# Patient Record
Sex: FEMALE | Race: WHITE | HISPANIC OR LATINO | Employment: UNEMPLOYED | URBAN - METROPOLITAN AREA
[De-identification: names, ages, dates, MRNs, and addresses within clinical notes are randomized per-mention and may not be internally consistent; named-entity substitution may affect disease eponyms.]

---

## 2019-07-31 ENCOUNTER — HOSPITAL ENCOUNTER (OUTPATIENT)
Dept: LAB | Facility: MEDICAL CENTER | Age: 34
End: 2019-07-31
Payer: COMMERCIAL

## 2019-07-31 LAB
BP DIAS: 77 MMHG
BP SYS: 113 MMHG
CHOLEST SERPL-MCNC: 140 MG/DL (ref 100–199)
DIABETES HTDIA: NO
EVENT NAME HTEVT: NORMAL
GLUCOSE SERPL-MCNC: 96 MG/DL (ref 65–99)
HDLC SERPL-MCNC: 37 MG/DL
HYPERTENSION HTHYP: NO
LDLC SERPL CALC-MCNC: 70 MG/DL
SCREENING LOC CITY HTCIT: NORMAL
SCREENING LOC STATE HTSTA: NORMAL
SCREENING LOCATION HTLOC: NORMAL
SMOKING HTSMO: NO
SUBSCRIBER ID HTSID: NORMAL
TRIGL SERPL-MCNC: 166 MG/DL (ref 0–149)

## 2019-08-01 LAB — FASTING STATUS PATIENT QL REPORTED: NORMAL

## 2020-01-09 ENCOUNTER — APPOINTMENT (RX ONLY)
Dept: URBAN - METROPOLITAN AREA CLINIC 31 | Facility: CLINIC | Age: 35
Setting detail: DERMATOLOGY
End: 2020-01-09

## 2020-01-09 DIAGNOSIS — L40.0 PSORIASIS VULGARIS: ICD-10-CM

## 2020-01-09 PROCEDURE — ? PRESCRIPTION

## 2020-01-09 PROCEDURE — ? COUNSELING

## 2020-01-09 PROCEDURE — 99202 OFFICE O/P NEW SF 15 MIN: CPT

## 2020-01-09 RX ORDER — CLOBETASOL PROPIONATE 0.5 MG/ML
SOLUTION TOPICAL QHS
Qty: 1 | Refills: 2 | COMMUNITY
Start: 2020-01-09

## 2020-01-09 RX ADMIN — CLOBETASOL PROPIONATE: 0.5 SOLUTION TOPICAL at 00:00

## 2020-01-09 ASSESSMENT — LOCATION SIMPLE DESCRIPTION DERM
LOCATION SIMPLE: SCALP
LOCATION SIMPLE: LEFT SCALP

## 2020-01-09 ASSESSMENT — LOCATION DETAILED DESCRIPTION DERM
LOCATION DETAILED: LEFT CENTRAL PARIETAL SCALP
LOCATION DETAILED: LEFT MEDIAL FRONTAL SCALP
LOCATION DETAILED: RIGHT CENTRAL PARIETAL SCALP

## 2020-01-09 ASSESSMENT — LOCATION ZONE DERM: LOCATION ZONE: SCALP

## 2020-01-09 NOTE — PROCEDURE: COUNSELING
Patient Specific Counseling (Will Not Stick From Patient To Patient): Recommended use of clobetasol nightly for 6 weeks, then RTC and reassess.
Detail Level: Simple

## 2020-02-27 ENCOUNTER — APPOINTMENT (RX ONLY)
Dept: URBAN - METROPOLITAN AREA CLINIC 31 | Facility: CLINIC | Age: 35
Setting detail: DERMATOLOGY
End: 2020-02-27

## 2020-02-27 DIAGNOSIS — L40.0 PSORIASIS VULGARIS: ICD-10-CM | Status: IMPROVED

## 2020-02-27 PROCEDURE — ? COUNSELING

## 2020-02-27 PROCEDURE — 99213 OFFICE O/P EST LOW 20 MIN: CPT

## 2020-02-27 ASSESSMENT — LOCATION SIMPLE DESCRIPTION DERM
LOCATION SIMPLE: LEFT SCALP
LOCATION SIMPLE: SCALP

## 2020-02-27 ASSESSMENT — LOCATION DETAILED DESCRIPTION DERM
LOCATION DETAILED: RIGHT CENTRAL PARIETAL SCALP
LOCATION DETAILED: LEFT CENTRAL PARIETAL SCALP
LOCATION DETAILED: LEFT MEDIAL FRONTAL SCALP

## 2020-02-27 ASSESSMENT — LOCATION ZONE DERM: LOCATION ZONE: SCALP

## 2020-02-27 NOTE — PROCEDURE: COUNSELING
Patient Specific Counseling (Will Not Stick From Patient To Patient): Continue use of clobetasol solution as needed on scalp
Detail Level: Zone

## 2021-04-02 RX ORDER — CLOBETASOL PROPIONATE 0.5 MG/ML
SOLUTION TOPICAL QHS
Qty: 1 | Refills: 2 | Status: ERX

## 2021-09-24 ENCOUNTER — HOSPITAL ENCOUNTER (EMERGENCY)
Dept: HOSPITAL 8 - ED | Age: 36
Discharge: HOME | End: 2021-09-24
Payer: COMMERCIAL

## 2021-09-24 VITALS — DIASTOLIC BLOOD PRESSURE: 87 MMHG

## 2021-09-24 VITALS — WEIGHT: 184.31 LBS | BODY MASS INDEX: 36.18 KG/M2 | HEIGHT: 60 IN

## 2021-09-24 VITALS — SYSTOLIC BLOOD PRESSURE: 128 MMHG

## 2021-09-24 DIAGNOSIS — Z20.822: ICD-10-CM

## 2021-09-24 DIAGNOSIS — R51.9: Primary | ICD-10-CM

## 2021-09-24 DIAGNOSIS — R11.0: ICD-10-CM

## 2021-09-24 PROCEDURE — 96372 THER/PROPH/DIAG INJ SC/IM: CPT

## 2021-09-24 PROCEDURE — U0003 INFECTIOUS AGENT DETECTION BY NUCLEIC ACID (DNA OR RNA); SEVERE ACUTE RESPIRATORY SYNDROME CORONAVIRUS 2 (SARS-COV-2) (CORONAVIRUS DISEASE [COVID-19]), AMPLIFIED PROBE TECHNIQUE, MAKING USE OF HIGH THROUGHPUT TECHNOLOGIES AS DESCRIBED BY CMS-2020-01-R: HCPCS

## 2021-09-24 PROCEDURE — 99283 EMERGENCY DEPT VISIT LOW MDM: CPT

## 2022-05-18 ENCOUNTER — TELEPHONE (OUTPATIENT)
Dept: SCHEDULING | Facility: IMAGING CENTER | Age: 37
End: 2022-05-18

## 2022-06-08 ENCOUNTER — RX ONLY (OUTPATIENT)
Age: 37
Setting detail: RX ONLY
End: 2022-06-08

## 2022-06-08 RX ORDER — CLOBETASOL PROPIONATE 0.5 MG/ML
SOLUTION TOPICAL
Qty: 50 | Refills: 0 | Status: ERX | COMMUNITY
Start: 2022-06-08

## 2022-06-21 ENCOUNTER — APPOINTMENT (OUTPATIENT)
Dept: MEDICAL GROUP | Facility: MEDICAL CENTER | Age: 37
End: 2022-06-21

## 2022-07-07 ENCOUNTER — TELEPHONE (OUTPATIENT)
Dept: MEDICAL GROUP | Facility: MEDICAL CENTER | Age: 37
End: 2022-07-07

## 2022-07-07 ENCOUNTER — OFFICE VISIT (OUTPATIENT)
Dept: MEDICAL GROUP | Facility: MEDICAL CENTER | Age: 37
End: 2022-07-07
Payer: COMMERCIAL

## 2022-07-07 VITALS
OXYGEN SATURATION: 96 % | TEMPERATURE: 97.9 F | BODY MASS INDEX: 26.64 KG/M2 | DIASTOLIC BLOOD PRESSURE: 82 MMHG | HEIGHT: 61 IN | HEART RATE: 88 BPM | WEIGHT: 141.09 LBS | SYSTOLIC BLOOD PRESSURE: 128 MMHG

## 2022-07-07 DIAGNOSIS — K76.0 FATTY LIVER: ICD-10-CM

## 2022-07-07 DIAGNOSIS — Z00.00 PREVENTATIVE HEALTH CARE: ICD-10-CM

## 2022-07-07 DIAGNOSIS — Z13.29 THYROID DISORDER SCREEN: ICD-10-CM

## 2022-07-07 DIAGNOSIS — Z13.220 LIPID SCREENING: ICD-10-CM

## 2022-07-07 DIAGNOSIS — R55 SYNCOPE, UNSPECIFIED SYNCOPE TYPE: ICD-10-CM

## 2022-07-07 DIAGNOSIS — L40.9 PSORIASIS: ICD-10-CM

## 2022-07-07 DIAGNOSIS — R73.01 IMPAIRED FASTING BLOOD SUGAR: ICD-10-CM

## 2022-07-07 DIAGNOSIS — Z12.4 CERVICAL CANCER SCREENING: ICD-10-CM

## 2022-07-07 DIAGNOSIS — G43.709 CHRONIC MIGRAINE WITHOUT AURA WITHOUT STATUS MIGRAINOSUS, NOT INTRACTABLE: ICD-10-CM

## 2022-07-07 PROCEDURE — 99204 OFFICE O/P NEW MOD 45 MIN: CPT | Performed by: INTERNAL MEDICINE

## 2022-07-07 ASSESSMENT — ENCOUNTER SYMPTOMS
LOSS OF CONSCIOUSNESS: 1
FEVER: 0
DIZZINESS: 1
SENSORY CHANGE: 0
TREMORS: 0
PALPITATIONS: 0
CHILLS: 0
NAUSEA: 0
WEAKNESS: 0
COUGH: 0
WEIGHT LOSS: 0
SPEECH CHANGE: 0
FOCAL WEAKNESS: 0
HEADACHES: 1
DEPRESSION: 0
VOMITING: 0
TINGLING: 0
SORE THROAT: 0

## 2022-07-07 ASSESSMENT — PATIENT HEALTH QUESTIONNAIRE - PHQ9: CLINICAL INTERPRETATION OF PHQ2 SCORE: 0

## 2022-07-07 NOTE — LETTER
[facility]  Yesica Fulton M.D.  [address]  [place] [address]  Fax: [phone]   Authorization for Release/Disclosure of   Protected Health Information   Name: NIKO DELGADO : 1985 SSN: [id number]   Address: [address] [place] [address] [place] [address] Phone:    [phone] (home)    I authorize the entity listed below to release/disclose the PHI below to:   [facility]/Yesica Fulton M.D. and Yesica Fulton M.D.   Provider or Entity Name:  [facility]    Address   [facility], Zip  [address] [address]  Phone:      Fax:     Reason for request: continuity of care   Information to be released:    [  ] LAST COLONOSCOPY,  including any PATH REPORT and follow-up  [  ] LAST FIT/COLOGUARD RESULT [  ] LAST DEXA  [  ] LAST MAMMOGRAM  [  ] LAST PAP  [  x] LAST LABS [  ] RETINA EXAM REPORT  [  ] IMMUNIZATION RECORDS  [x  ] Release all info      [  ] Check here and initial the line next to each item to release ALL health information INCLUDING  _____ Care and treatment for drug and / or alcohol abuse  _____ HIV testing, infection status, or AIDS  _____ Genetic Testing    DATES OF SERVICE OR TIME PERIOD TO BE DISCLOSED: _____________  I understand and acknowledge that:  * This Authorization may be revoked at any time by you in writing, except if your health information has already been used or disclosed.  * Your health information that will be used or disclosed as a result of you signing this authorization could be re-disclosed by the recipient. If this occurs, your re-disclosed health information may no longer be protected by State or Federal laws.  * You may refuse to sign this Authorization. Your refusal will not affect your ability to obtain treatment.  * This Authorization becomes effective upon signing and will  on (date) __________.      If no date is indicated, this Authorization will  one (1) year from the signature date.     Name: Dionne Mckinnon    Signature:   Date:     7/7/2022       PLEASE FAX REQUESTED RECORDS BACK TO: [phone]

## 2022-07-07 NOTE — LETTER
[facility]"[employer] [facility]  Yesica Fulton M.D.  [address]  [place] [address]  Fax: [phone]   Authorization for Release/Disclosure of   Protected Health Information   Name: DIONNE DELGADO : 1985 SSN: [id number]   Address: [address] [place] [address]   [place] [address] Phone:    [phone] (home)    I authorize the entity listed below to release/disclose the PHI below to:   [facility]/Yesica Fulton M.D. and Yesica Fulton M.D.   Provider or Entity Name:  [facility]   Address   City, State, [place], [place] Phone:      Fax:     Reason for request: continuity of care   Information to be released:    [  ] LAST COLONOSCOPY,  including any PATH REPORT and follow-up  [  ] LAST FIT/COLOGUARD RESULT [  ] LAST DEXA  [  ] LAST MAMMOGRAM  [  ] LAST PAP  [  ] LAST LABS [  ] RETINA EXAM REPORT  [  ] IMMUNIZATION RECORDS  [  ] Release all info      [  ] Check here and initial the line next to each item to release ALL health information INCLUDING  _____ Care and treatment for drug and / or alcohol abuse  _____ HIV testing, infection status, or AIDS  _____ Genetic Testing    DATES OF SERVICE OR TIME PERIOD TO BE DISCLOSED: _____________  I understand and acknowledge that:  * This Authorization may be revoked at any time by you in writing, except if your health information has already been used or disclosed.  * Your health information that will be used or disclosed as a result of you signing this authorization could be re-disclosed by the recipient. If this occurs, your re-disclosed health information may no longer be protected by State or Federal laws.  * You may refuse to sign this Authorization. Your refusal will not affect your ability to obtain treatment.  * This Authorization becomes effective upon signing and will  on (date) __________.      If no date is indicated, this Authorization will  one (1) year from the signature date.    Name: Dionne  Pratibha    Signature:   Date:     7/7/2022       PLEASE FAX REQUESTED RECORDS BACK TO: [phone]

## 2022-07-07 NOTE — ASSESSMENT & PLAN NOTE
Chronic, currently well controlled.  Her episodes became really rare in the last few weeks.  Offered as needed medication-patient declines at this time.  Will refer to neurology.

## 2022-07-07 NOTE — PROGRESS NOTES
Subjective:     Chief Complaint   Patient presents with   • Syncope     In November   • Headache      Diagnoses of Syncope, unspecified syncope type, Chronic migraine without aura without status migrainosus, not intractable, Fatty liver, Psoriasis, Cervical cancer screening, Preventative health care, Lipid screening, Impaired fasting blood sugar, and Thyroid disorder screen were pertinent to this visit.    HISTORY OF THE PRESENT ILLNESS: Patient is a 36 y.o. female. This pleasant patient is here today to establish care. Her prior PCP was [facility] and   Napoleon Morris MD.    Problem   Syncope    Around October 2021, patient was working on her computer and she had a syncopal episode.  She woke up approximately 15 minutes after the episode, her friend told her that she was shaking her extremities at this time.  No tongue biting, urinary or stool incontinence.  Lasted for approximately 15 minutes without postictal state.   Since then patient has been experiencing recurrent headaches, no recurrent syncopal episodes.  Patient has  family history of intracranial bleeding and seizure disorder.        Chronic Migraine Without Aura Without Status Migrainosus, Not Intractable    Is a chronic problem, for over 15 years.  Patient and generally would have her migraine attacks once in a while.  From November 2021 to May 2022 she had very frequently sometimes every day.  In the last 2 weeks, she had only rarely.  Offered sumatriptan-patient declined at this time.  She manages her headaches with Tylenol as needed.       Fatty Liver    This is a chronic problem, her liver function tests were monitored by her prior family physician.  No recent LFTs available.     Psoriasis    Chronic, patient is seen dermatology in [place].  Using topical treatment and over-the-counter shampoo from [employer].       Past Medical History:   Diagnosis Date   • Fatty liver    • Syncope      History reviewed. No pertinent surgical  "history.  Family History   Problem Relation Age of Onset   • Other Father         brain hematoma, s/p trauma   • Other Brother         brain hematoma, s/p trauma   • Seizures Brother    • Cancer Paternal Grandmother         brain tumor   • Seizures Paternal Grandmother      Social History     Tobacco Use   • Smoking status: Never Smoker   • Smokeless tobacco: Never Used   Substance Use Topics   • Alcohol use: Never   • Drug use: Never     No current [facility]-ordered outpatient medications on file.     No current [facility]-ordered facility-administered medications on file.     Health Maintenance: Requested from PCP.    Review of Systems   Constitutional: Negative for chills, fever and weight loss.   HENT: Negative for sore throat.    Respiratory: Negative for cough.    Cardiovascular: Negative for chest pain and palpitations.   Gastrointestinal: Negative for nausea and vomiting.   Neurological: Positive for dizziness (intermittent), loss of consciousness (October 2021) and headaches. Negative for tingling, tremors, sensory change, speech change, focal weakness and weakness.   Psychiatric/Behavioral: Negative for depression.     Objective:     Exam: /82 (BP Location: Right arm, Patient Position: Sitting, BP Cuff Size: Adult)   Pulse 88   Temp 36.6 °C (97.9 °F) (Temporal)   Ht 1.549 m (5' 1\")   Wt 64 kg (141 lb 1.5 oz)   SpO2 96%  Body mass index is 26.66 kg/m².    Physical Exam  Vitals reviewed: Overweight.   HENT:      Head: Normocephalic and atraumatic.      Nose: Nose normal. No congestion or rhinorrhea.      Mouth/Throat:      Mouth: Mucous membranes are moist.      Pharynx: Oropharynx is clear. No oropharyngeal exudate.   Eyes:      General: No scleral icterus.     Pupils: Pupils are equal, round, and reactive to light.   Cardiovascular:      Rate and Rhythm: Normal rate and regular rhythm.      Pulses: Normal pulses.      Heart sounds: Normal heart sounds. No murmur heard.    No gallop.   Pulmonary:      " Effort: Pulmonary effort is normal. No respiratory distress.      Breath sounds: Normal breath sounds. No wheezing or rales.   Skin:     General: Skin is warm and dry.   Neurological:      Mental Status: She is alert and oriented to person, place, and time.      Cranial Nerves: No cranial nerve deficit or facial asymmetry.      Sensory: Sensation is intact.      Motor: Motor function is intact. No weakness, tremor, atrophy, abnormal muscle tone, seizure activity or pronator drift.      Coordination: Coordination normal.      Gait: Gait normal.      Deep Tendon Reflexes:      Reflex Scores:       Bicep reflexes are 2+ on the right side and 2+ on the left side.       Patellar reflexes are 2+ on the right side and 2+ on the left side.      Labs: Reviewed lipid panel from 7/31/2019.    Assessment & Plan:   36 y.o. female with the following -    Problem List Items Addressed This Visit     Chronic migraine without aura without status migrainosus, not intractable (Chronic)     Chronic, currently well controlled.  Her episodes became really rare in the last few weeks.  Offered as needed medication-patient declines at this time.  Will refer to neurology.           Relevant Orders    Referral to Neurology    Fatty liver (Chronic)     Chronic, symptomatic, obtain LFTs.           Relevant Orders    Comp Metabolic Panel    Psoriasis (Chronic)     Chronic, topical treatment as needed.           Syncope     Single episode in October 2021, lasted around 15 minutes.  Possible generalized clonic seizure?  Positive family history of seizure disorder, traumatic intracranial bleeding.   Recurrent headaches, obtain routine blood work, prior records from previous physician.  We will discuss imaging at next appointment in 3 to 4 weeks after reviewing previous records.    Refer to neurology.             Relevant Orders    Referral to Neurology      Other Visit Diagnoses     Cervical cancer screening        Relevant Orders    Referral to  Gynecology    Preventative health care        Relevant Orders    CBC WITH DIFFERENTIAL    Comp Metabolic Panel    Lipid screening        Relevant Orders    Lipid Profile    Impaired fasting blood sugar        Relevant Orders    HEMOGLOBIN A1C    Thyroid disorder screen        Relevant Orders    TSH WITH REFLEX TO FT4          Return in about 4 weeks (around 8/4/2022), or if symptoms worsen or fail to improve.    Please note that this dictation was created using voice recognition software. I have made every reasonable attempt to correct obvious errors, but I expect that there are errors of grammar and possibly content that I did not discover before finalizing the note.

## 2022-07-07 NOTE — ASSESSMENT & PLAN NOTE
Single episode in October 2021, lasted around 15 minutes.  Possible generalized clonic seizure?  Positive family history of seizure disorder, traumatic intracranial bleeding.   Recurrent headaches, obtain routine blood work, prior records from previous physician.  We will discuss imaging at next appointment in 3 to 4 weeks after reviewing previous records.    Refer to neurology.

## 2022-07-12 ENCOUNTER — APPOINTMENT (RX ONLY)
Dept: URBAN - METROPOLITAN AREA CLINIC 31 | Facility: CLINIC | Age: 37
Setting detail: DERMATOLOGY
End: 2022-07-12

## 2022-07-12 DIAGNOSIS — L40.0 PSORIASIS VULGARIS: ICD-10-CM

## 2022-07-12 LAB
ALBUMIN SERPL-MCNC: 4.5 G/DL (ref 3.8–4.8)
ALBUMIN/GLOB SERPL: 1.6 {RATIO} (ref 1.2–2.2)
ALP SERPL-CCNC: 83 IU/L (ref 44–121)
ALT SERPL-CCNC: 126 IU/L (ref 0–32)
AST SERPL-CCNC: 58 IU/L (ref 0–40)
BASOPHILS # BLD AUTO: 0 X10E3/UL (ref 0–0.2)
BASOPHILS NFR BLD AUTO: 1 %
BILIRUB SERPL-MCNC: 1.2 MG/DL (ref 0–1.2)
BUN SERPL-MCNC: 13 MG/DL (ref 6–20)
BUN/CREAT SERPL: 20 (ref 9–23)
CALCIUM SERPL-MCNC: 8.9 MG/DL (ref 8.7–10.2)
CHLORIDE SERPL-SCNC: 101 MMOL/L (ref 96–106)
CHOLEST SERPL-MCNC: 172 MG/DL (ref 100–199)
CO2 SERPL-SCNC: 20 MMOL/L (ref 20–29)
CREAT SERPL-MCNC: 0.66 MG/DL (ref 0.57–1)
EGFRCR SERPLBLD CKD-EPI 2021: 115 ML/MIN/1.73
EOSINOPHIL # BLD AUTO: 0.1 X10E3/UL (ref 0–0.4)
EOSINOPHIL NFR BLD AUTO: 1 %
ERYTHROCYTE [DISTWIDTH] IN BLOOD BY AUTOMATED COUNT: 12.4 % (ref 11.7–15.4)
GLOBULIN SER CALC-MCNC: 2.9 G/DL (ref 1.5–4.5)
GLUCOSE SERPL-MCNC: 156 MG/DL (ref 65–99)
HBA1C MFR BLD: 7 % (ref 4.8–5.6)
HCT VFR BLD AUTO: 42.4 % (ref 34–46.6)
HDLC SERPL-MCNC: 41 MG/DL
HGB BLD-MCNC: 13.8 G/DL (ref 11.1–15.9)
IMM GRANULOCYTES # BLD AUTO: 0 X10E3/UL (ref 0–0.1)
IMM GRANULOCYTES NFR BLD AUTO: 0 %
IMMATURE CELLS  115398: NORMAL
LABORATORY COMMENT REPORT: ABNORMAL
LDLC SERPL CALC-MCNC: 103 MG/DL (ref 0–99)
LYMPHOCYTES # BLD AUTO: 2.8 X10E3/UL (ref 0.7–3.1)
LYMPHOCYTES NFR BLD AUTO: 37 %
MCH RBC QN AUTO: 29.4 PG (ref 26.6–33)
MCHC RBC AUTO-ENTMCNC: 32.5 G/DL (ref 31.5–35.7)
MCV RBC AUTO: 90 FL (ref 79–97)
MONOCYTES # BLD AUTO: 0.3 X10E3/UL (ref 0.1–0.9)
MONOCYTES NFR BLD AUTO: 4 %
MORPHOLOGY BLD-IMP: NORMAL
NEUTROPHILS # BLD AUTO: 4.3 X10E3/UL (ref 1.4–7)
NEUTROPHILS NFR BLD AUTO: 57 %
NRBC BLD AUTO-RTO: NORMAL %
PLATELET # BLD AUTO: 287 X10E3/UL (ref 150–450)
POTASSIUM SERPL-SCNC: 4.4 MMOL/L (ref 3.5–5.2)
PROT SERPL-MCNC: 7.4 G/DL (ref 6–8.5)
RBC # BLD AUTO: 4.69 X10E6/UL (ref 3.77–5.28)
SODIUM SERPL-SCNC: 136 MMOL/L (ref 134–144)
T4 FREE SERPL-MCNC: 0.99 NG/DL (ref 0.82–1.77)
TRIGL SERPL-MCNC: 162 MG/DL (ref 0–149)
TSH SERPL DL<=0.005 MIU/L-ACNC: 4.69 UIU/ML (ref 0.45–4.5)
VLDLC SERPL CALC-MCNC: 28 MG/DL (ref 5–40)
WBC # BLD AUTO: 7.5 X10E3/UL (ref 3.4–10.8)

## 2022-07-12 PROCEDURE — 99213 OFFICE O/P EST LOW 20 MIN: CPT

## 2022-07-12 PROCEDURE — ? ADDITIONAL NOTES

## 2022-07-12 PROCEDURE — ? COUNSELING

## 2022-07-12 PROCEDURE — ? PRESCRIPTION

## 2022-07-12 RX ORDER — CLOBETASOL PROPIONATE 0.5 MG/ML
SOLUTION TOPICAL QHS
Qty: 50 | Refills: 11 | Status: ERX

## 2022-07-12 ASSESSMENT — LOCATION SIMPLE DESCRIPTION DERM: LOCATION SIMPLE: POSTERIOR SCALP

## 2022-07-12 ASSESSMENT — LOCATION DETAILED DESCRIPTION DERM: LOCATION DETAILED: MID-OCCIPITAL SCALP

## 2022-07-12 ASSESSMENT — LOCATION ZONE DERM: LOCATION ZONE: SCALP

## 2022-07-12 NOTE — PROCEDURE: ADDITIONAL NOTES
Render Risk Assessment In Note?: no
Additional Notes: Advised patient to continue using clobetasol scalp solution for flares\\nAdvised pt to see her PCP re shoulder pain and finger stiffness, to consider the possibility of psoriatic arthritis.
Detail Level: Detailed

## 2022-08-03 PROBLEM — E11.9 NEWLY DIAGNOSED DIABETES (HCC): Status: ACTIVE | Noted: 2022-08-03

## 2022-08-04 ENCOUNTER — OFFICE VISIT (OUTPATIENT)
Dept: MEDICAL GROUP | Facility: MEDICAL CENTER | Age: 37
End: 2022-08-04
Payer: COMMERCIAL

## 2022-08-04 ENCOUNTER — HOSPITAL ENCOUNTER (OUTPATIENT)
Facility: MEDICAL CENTER | Age: 37
End: 2022-08-04
Attending: INTERNAL MEDICINE
Payer: COMMERCIAL

## 2022-08-04 VITALS
TEMPERATURE: 97.7 F | HEART RATE: 78 BPM | SYSTOLIC BLOOD PRESSURE: 118 MMHG | RESPIRATION RATE: 18 BRPM | BODY MASS INDEX: 34.55 KG/M2 | WEIGHT: 182.98 LBS | OXYGEN SATURATION: 98 % | HEIGHT: 61 IN | DIASTOLIC BLOOD PRESSURE: 82 MMHG

## 2022-08-04 DIAGNOSIS — L50.9 HIVES: ICD-10-CM

## 2022-08-04 DIAGNOSIS — K76.0 FATTY LIVER: Chronic | ICD-10-CM

## 2022-08-04 DIAGNOSIS — E11.9 NEWLY DIAGNOSED DIABETES (HCC): ICD-10-CM

## 2022-08-04 DIAGNOSIS — R79.89 ABNORMAL TSH: ICD-10-CM

## 2022-08-04 DIAGNOSIS — R55 SYNCOPE, UNSPECIFIED SYNCOPE TYPE: ICD-10-CM

## 2022-08-04 LAB
CREAT UR-MCNC: 121.64 MG/DL
MICROALBUMIN UR-MCNC: <1.2 MG/DL
MICROALBUMIN/CREAT UR: NORMAL MG/G (ref 0–30)

## 2022-08-04 PROCEDURE — 82570 ASSAY OF URINE CREATININE: CPT

## 2022-08-04 PROCEDURE — 99214 OFFICE O/P EST MOD 30 MIN: CPT | Performed by: INTERNAL MEDICINE

## 2022-08-04 PROCEDURE — 82043 UR ALBUMIN QUANTITATIVE: CPT

## 2022-08-04 ASSESSMENT — ENCOUNTER SYMPTOMS
SENSORY CHANGE: 0
SORE THROAT: 0
PALPITATIONS: 0
VOMITING: 0
TREMORS: 0
COUGH: 0
NAUSEA: 0
WEIGHT LOSS: 0
CHILLS: 0
DEPRESSION: 0
FEVER: 0

## 2022-08-04 ASSESSMENT — FIBROSIS 4 INDEX: FIB4 SCORE: 0.68

## 2022-08-04 NOTE — PROGRESS NOTES
Subjective:     CC:   Chief Complaint   Patient presents with   • Follow-Up   • Lab Results   • Urticaria     Broke in hives for 23 days Last Friday to Mionday      Diagnoses of Newly diagnosed diabetes (HCC), Fatty liver, Hives, Syncope, unspecified syncope type, and Abnormal TSH were pertinent to this visit.    HPI: Dionne is a pleasant 36 y.o. female who presents today to follow-up on her lab results and coincidentally she has had an episode of hives over the weekend.    Problem   Hives    Patient is currently in the process of packing and moving to [place].  Last week she has developed hives, that have been progressing and are not improving with Benadryl.  She saw her previous family nurse practitioner, who recommended Tagamet and prescribed her steroids.  She did  the steroid prescription, however she did have to take it.    She was also referred to allergist by the following expectoration.  Today she is asymptomatic and she has no signs or symptoms of hives.     Abnormal Tsh      Component      Latest Ref Rng & Units 7/8/2022           4:07 AM   TSH      0.450 - 4.500 uIU/mL 4.690 (H)   Elevated TSH, does not warrant treatment at this time, and free T4 is normal.  Recommended to follow-up with new primary care physician in [place].  For repeat TSH in 2 to 3 months.     Newly Diagnosed Diabetes (Hcc)    Newly diagnosed, patient's A1c was always borderline prior.  Last A1c:   Lab Results   Component Value Date/Time    HBA1C 7.0 (H) 07/08/2022 0407   Microalbumin creatinine collected today  Last diabetic foot exam: 8/4/2022  Last retinal eye exam: Referred  Nightly foot checks: advised   HTN: No       Fatty Liver    This is a chronic problem, her liver function tests were monitored by her prior family physician.   Lab Results   Component Value Date/Time    SODIUM 136 07/08/2022 04:07 AM    POTASSIUM 4.4 07/08/2022 04:07 AM    CHLORIDE 101 07/08/2022 04:07 AM    CO2 20 07/08/2022 04:07 AM    GLUCOSE 156 (H)  "07/08/2022 04:07 AM    GLUCOSE 96 07/31/2019 12:49 PM    BUN 13 07/08/2022 04:07 AM    CREATININE 0.66 07/08/2022 04:07 AM    CALCIUM 8.9 07/08/2022 04:07 AM    ASTSGOT 58 (H) 07/08/2022 04:07 AM    ALTSGPT 126 (H) 07/08/2022 04:07 AM    TBILIRUBIN 1.2 07/08/2022 04:07 AM    ALBUMIN 4.5 07/08/2022 04:07 AM    TOTPROTEIN 7.4 07/08/2022 04:07 AM    GLOBULIN 2.9 07/08/2022 04:07 AM    AGRATIO 1.6 07/08/2022 04:07 AM     Ultrasound of the right upper quadrant consistent with fatty liver disease.         Past Medical History:   Diagnosis Date   • Fatty liver    • Syncope      No past surgical history on file.  Family History   Problem Relation Age of Onset   • Other Father         brain hematoma, s/p trauma   • Other Brother         brain hematoma, s/p trauma   • Seizures Brother    • Cancer Paternal Grandmother         brain tumor   • Seizures Paternal Grandmother      Social History     Tobacco Use   • Smoking status: Never Smoker   • Smokeless tobacco: Never Used   Vaping Use   • Vaping Use: Never used   Substance Use Topics   • Alcohol use: Never   • Drug use: Never       Current Outpatient Medications Ordered in Epic   Medication Sig Dispense Refill   • metFORMIN (GLUCOPHAGE) 500 MG Tab Take 1 Tablet by mouth 2 times a day with meals. 180 Tablet 1     No current Epic-ordered facility-administered medications on file.     Review of Systems   Constitutional: Negative for chills, fever and weight loss.   HENT: Negative for sore throat.    Respiratory: Negative for cough.    Cardiovascular: Negative for chest pain and palpitations.   Gastrointestinal: Negative for nausea and vomiting.   Neurological: Negative for tremors and sensory change.   Psychiatric/Behavioral: Negative for depression.     Objective:     Exam:  /82 (BP Location: Left arm, Patient Position: Sitting, BP Cuff Size: Adult)   Pulse 78   Temp 36.5 °C (97.7 °F) (Temporal)   Resp 18   Ht 1.549 m (5' 1\")   Wt 83 kg (182 lb 15.7 oz)   SpO2 98%   " BMI 34.57 kg/m²  Body mass index is 34.57 kg/m².    Physical Exam  Vitals reviewed: Overweight.   HENT:      Head: Normocephalic and atraumatic.      Nose: Nose normal. No congestion or rhinorrhea.      Mouth/Throat:      Mouth: Mucous membranes are moist.      Pharynx: Oropharynx is clear. No oropharyngeal exudate.   Eyes:      General: No scleral icterus.     Pupils: Pupils are equal, round, and reactive to light.   Cardiovascular:      Rate and Rhythm: Normal rate and regular rhythm.      Pulses: Normal pulses.      Heart sounds: Normal heart sounds. No murmur heard.    No gallop.   Pulmonary:      Effort: Pulmonary effort is normal.      Breath sounds: Normal breath sounds.   Skin:     General: Skin is warm and dry.   Psychiatric:         Mood and Affect: Mood normal.       Monofilament testing with a 10 gram force: sensation intact: intact bilaterally  Visual Inspection: Feet without maceration, ulcers, fissures.  Pedal pulses: intact bilaterally    Labs: Discussed results of CBC, CMP, lipid panel A1c and thyroid hormone levels from 7/7/2022.    Assessment & Plan:   Dionne  is a pleasant 36 y.o. female with the following -     Problem List Items Addressed This Visit     Fatty liver (Chronic)     ALT and AST levels remain elevated, I do quadrant ultrasound was concerned with fatty liver disease.  Patient is moving to [place] in 2 weeks and she will establish with new family practitioner there.  Counseled on healthful lifestyle measures, weight loss, cholesterol and diet.   - Consider referral to hepatology if levels continue to rise.   - Repeat CMP every 6 months.           Abnormal TSH     TSH in 2 to 3 months.           Hives     Asymptomatic today, symptoms resolved.  Counseled on signs and symptoms of anaphylactic reaction.  Discussed emergency room precautions and extent.Was referred to allergy by family nurse practitioner.   - Continue Benadryl. Tegamet  as needed, steroids for persistent symptoms.   - Seek  care in Emergency room for difficulty breathing, throat swelling and signs and symptoms of anaphylactic reaction.           Newly diagnosed diabetes (HCC)     New onset, consistency lifestyle modifications, start metformin 500 mg 2 times daily.  Patient is moving to [place] this month and will be establishing with new family physician.  Refer to diabetic education and for retinal screening.   - Establish care with new PCP in [place] for further diabetes management.           Relevant Medications    metFORMIN (GLUCOPHAGE) 500 MG Tab    Other Relevant Orders    MICROALBUMIN CREAT RATIO URINE    Referral for Retinal Screening Exam    Diabetic Monofilament LE Exam (Completed)    Referral to Diabetic Education    Syncope    Relevant Orders    Referral to Neurology          Return if symptoms worsen or fail to improve.    Please note that this dictation was created using voice recognition software. I have made every reasonable attempt to correct obvious errors, but I expect that there are errors of grammar and possibly content that I did not discover before finalizing the note.

## 2022-08-04 NOTE — ASSESSMENT & PLAN NOTE
New onset, consistency lifestyle modifications, start metformin 500 mg 2 times daily.  Patient is moving to [place] this month and will be establishing with new family physician.  Refer to diabetic education and for retinal screening.   - Establish care with new PCP in [place] for further diabetes management.

## 2022-08-04 NOTE — ASSESSMENT & PLAN NOTE
Asymptomatic today, symptoms resolved.  Counseled on signs and symptoms of anaphylactic reaction.  Discussed emergency room precautions and extent.Was referred to allergy by family nurse practitioner.   - Continue Benadryl. Tegamet  as needed, steroids for persistent symptoms.   - Seek care in Emergency room for difficulty breathing, throat swelling and signs and symptoms of anaphylactic reaction.

## 2022-08-04 NOTE — ASSESSMENT & PLAN NOTE
ALT and AST levels remain elevated, I do quadrant ultrasound was concerned with fatty liver disease.  Patient is moving to [place] in 2 weeks and she will establish with new family practitioner there.  Counseled on healthful lifestyle measures, weight loss, cholesterol and diet.   - Consider referral to hepatology if levels continue to rise.   - Repeat CMP every 6 months.

## 2022-11-22 ENCOUNTER — TELEPHONE (OUTPATIENT)
Dept: NEUROLOGY | Facility: MEDICAL CENTER | Age: 37
End: 2022-11-22
Payer: COMMERCIAL

## 2022-11-22 NOTE — TELEPHONE ENCOUNTER
[facility]     [facility]? Yes    Is visit type and length correct?  Yes    Is referral attached to visit? Yes    Were records received from referring provider? Yes, referring provider [facility] so [facility].     Patient was not contacted to have someone accompany them to visit?    Is this appointment scheduled as a Hospital Follow-Up?  No    Does the patient require any pre procedure or post procedure follow up? No    If any orders were placed at last visit or intended to be done for this visit do we have Results/Consult Notes? Yes  Labs - Labs were not ordered at last office visit.  Imaging - Imaging was not ordered at last office visit.  Referrals - No referrals were ordered at last office visit.        10.  If patient appointment is for Botox - is order pended for provider? No